# Patient Record
Sex: MALE | Race: WHITE | NOT HISPANIC OR LATINO | URBAN - METROPOLITAN AREA
[De-identification: names, ages, dates, MRNs, and addresses within clinical notes are randomized per-mention and may not be internally consistent; named-entity substitution may affect disease eponyms.]

---

## 2023-07-06 ENCOUNTER — APPOINTMENT (OUTPATIENT)
Dept: LAB | Facility: LAB | Age: 28
End: 2023-07-06

## 2023-11-30 ENCOUNTER — APPOINTMENT (OUTPATIENT)
Dept: GASTROENTEROLOGY | Facility: CLINIC | Age: 28
End: 2023-11-30
Payer: MEDICAID

## 2024-01-05 ENCOUNTER — OFFICE VISIT (OUTPATIENT)
Dept: OPHTHALMOLOGY | Facility: CLINIC | Age: 29
End: 2024-01-05
Payer: COMMERCIAL

## 2024-01-05 DIAGNOSIS — H52.203 MYOPIA OF BOTH EYES WITH ASTIGMATISM: Primary | ICD-10-CM

## 2024-01-05 DIAGNOSIS — H52.13 MYOPIA OF BOTH EYES WITH ASTIGMATISM: Primary | ICD-10-CM

## 2024-01-05 PROCEDURE — 92015 DETERMINE REFRACTIVE STATE: CPT | Performed by: OPTOMETRIST

## 2024-01-05 PROCEDURE — 92004 COMPRE OPH EXAM NEW PT 1/>: CPT | Performed by: OPTOMETRIST

## 2024-01-05 PROCEDURE — 92310 CONTACT LENS FITTING OU: CPT | Performed by: OPTOMETRIST

## 2024-01-05 RX ORDER — QUETIAPINE FUMARATE 200 MG/1
200 TABLET, FILM COATED ORAL NIGHTLY
COMMUNITY

## 2024-01-05 RX ORDER — TOPIRAMATE 50 MG/1
50 TABLET, FILM COATED ORAL 2 TIMES DAILY
COMMUNITY

## 2024-01-05 ASSESSMENT — VISUAL ACUITY
OS_CC+: +1
OS_CC: 20/40
METHOD: SNELLEN - LINEAR
CORRECTION_TYPE: GLASSES
OD_CC+: +2
OD_CC: 20/40

## 2024-01-05 ASSESSMENT — REFRACTION_CURRENTRX
OD_BASECURVE: 8.6
OS_BASECURVE: 8.6
OD_CYLINDER: -0.75
OD_CYLINDER: -1.25
OS_CYLINDER: -0.75
OD_BRAND: TOTAL 30 FOR ASTIGMATISM
OD_DIAMETER: 14.5
OD_AXIS: 160
OD_BRAND: TOTAL 30 FOR ASTIGMATISM
OS_SPHERE: -3.00
OS_CYLINDER: -0.75
OS_BRAND: TOTAL 30 FOR ASTIGMATISM
OD_AXIS: 160
OD_SPHERE: -2.75
OS_BASECURVE: 8.6
OD_DIAMETER: 14.5
OS_DIAMETER: 14.5
OS_BRAND: TOTAL 30 FOR ASTIGMATISM
OS_DIAMETER: 14.5
OS_SPHERE: -3.00
OS_AXIS: 040
OS_AXIS: 010
OD_BASECURVE: 8.6
OD_SPHERE: -2.50

## 2024-01-05 ASSESSMENT — REFRACTION_MANIFEST
OD_AXIS: 155
OS_AXIS: 015
OD_CYLINDER: -1.25
OD_SPHERE: -2.75
OD_AXIS: 150
OS_CYLINDER: -1.00
OS_SPHERE: -2.50
METHOD_AUTOREFRACTION: 1
OS_SPHERE: -3.00
OD_SPHERE: -2.25
OS_AXIS: 015
OD_CYLINDER: -1.25
OS_CYLINDER: -1.00

## 2024-01-05 ASSESSMENT — EXTERNAL EXAM - RIGHT EYE: OD_EXAM: NORMAL

## 2024-01-05 ASSESSMENT — REFRACTION_WEARINGRX
OD_AXIS: 150
OS_CYLINDER: -0.50
OD_SPHERE: -2.00
OS_AXIS: 042
OD_CYLINDER: -1.00
OS_SPHERE: -2.75
SPECS_TYPE: SVL

## 2024-01-05 ASSESSMENT — ENCOUNTER SYMPTOMS
HEMATOLOGIC/LYMPHATIC NEGATIVE: 0
NEUROLOGICAL NEGATIVE: 0
GASTROINTESTINAL NEGATIVE: 0
CONSTITUTIONAL NEGATIVE: 0
PSYCHIATRIC NEGATIVE: 0
EYES NEGATIVE: 0
RESPIRATORY NEGATIVE: 0
ALLERGIC/IMMUNOLOGIC NEGATIVE: 0
MUSCULOSKELETAL NEGATIVE: 0
ENDOCRINE NEGATIVE: 0
CARDIOVASCULAR NEGATIVE: 0

## 2024-01-05 ASSESSMENT — TONOMETRY
IOP_METHOD: GOLDMANN APPLANATION
OS_IOP_MMHG: 17
OD_IOP_MMHG: 16

## 2024-01-05 ASSESSMENT — CONF VISUAL FIELD
OD_SUPERIOR_TEMPORAL_RESTRICTION: 0
OD_INFERIOR_TEMPORAL_RESTRICTION: 0
OS_SUPERIOR_NASAL_RESTRICTION: 0
OD_SUPERIOR_NASAL_RESTRICTION: 0
OS_SUPERIOR_TEMPORAL_RESTRICTION: 0
OS_INFERIOR_TEMPORAL_RESTRICTION: 0
OS_NORMAL: 1
OD_INFERIOR_NASAL_RESTRICTION: 0
OD_NORMAL: 1
OS_INFERIOR_NASAL_RESTRICTION: 0

## 2024-01-05 ASSESSMENT — REFRACTION
OD_AXIS: 155
OS_SPHERE: -2.75
OD_SPHERE: -2.75
OS_CYLINDER: -1.00
OS_AXIS: 015
OD_CYLINDER: -1.25

## 2024-01-05 ASSESSMENT — SLIT LAMP EXAM - LIDS
COMMENTS: NORMAL
COMMENTS: NORMAL

## 2024-01-05 ASSESSMENT — EXTERNAL EXAM - LEFT EYE: OS_EXAM: NORMAL

## 2024-01-05 NOTE — PROGRESS NOTES
[place] [facility] [place] and cylinder was adjusted. Will send trial lenses first. A contact lens prescription was dispensed at the patient's request. Recommend to try wearing reasders over contacts +1 power as well. A spectacle prescription was dispensed to be used as needed. The patient was asked to return to our clinic in one year or sooner if ocular or vision changes occur.

## 2024-01-15 ENCOUNTER — LAB (OUTPATIENT)
Dept: LAB | Facility: LAB | Age: 29
End: 2024-01-15
Payer: MEDICAID

## 2024-01-15 ENCOUNTER — OFFICE VISIT (OUTPATIENT)
Dept: GASTROENTEROLOGY | Facility: CLINIC | Age: 29
End: 2024-01-15
Payer: MEDICAID

## 2024-01-15 VITALS — OXYGEN SATURATION: 98 % | BODY MASS INDEX: 29.68 KG/M2 | WEIGHT: 212 LBS | HEART RATE: 87 BPM | HEIGHT: 71 IN

## 2024-01-15 DIAGNOSIS — K52.9 FREQUENT STOOLS: ICD-10-CM

## 2024-01-15 DIAGNOSIS — K92.1 HEMATOCHEZIA: ICD-10-CM

## 2024-01-15 DIAGNOSIS — K64.8 INTERNAL BLEEDING HEMORRHOIDS: ICD-10-CM

## 2024-01-15 DIAGNOSIS — K92.1 HEMATOCHEZIA: Primary | ICD-10-CM

## 2024-01-15 LAB
ALBUMIN SERPL BCP-MCNC: 4.6 G/DL (ref 3.4–5)
ALP SERPL-CCNC: 118 U/L (ref 33–120)
ALT SERPL W P-5'-P-CCNC: 38 U/L (ref 10–52)
ANION GAP SERPL CALC-SCNC: 13 MMOL/L (ref 10–20)
AST SERPL W P-5'-P-CCNC: 31 U/L (ref 9–39)
BILIRUB SERPL-MCNC: 0.3 MG/DL (ref 0–1.2)
BUN SERPL-MCNC: 15 MG/DL (ref 6–23)
CALCIUM SERPL-MCNC: 9.8 MG/DL (ref 8.6–10.6)
CHLORIDE SERPL-SCNC: 107 MMOL/L (ref 98–107)
CO2 SERPL-SCNC: 27 MMOL/L (ref 21–32)
CREAT SERPL-MCNC: 0.93 MG/DL (ref 0.5–1.3)
CRP SERPL-MCNC: 0.33 MG/DL
EGFRCR SERPLBLD CKD-EPI 2021: >90 ML/MIN/1.73M*2
ERYTHROCYTE [DISTWIDTH] IN BLOOD BY AUTOMATED COUNT: 12.5 % (ref 11.5–14.5)
ERYTHROCYTE [SEDIMENTATION RATE] IN BLOOD BY WESTERGREN METHOD: 2 MM/H (ref 0–15)
GLUCOSE SERPL-MCNC: 84 MG/DL (ref 74–99)
HCT VFR BLD AUTO: 45.4 % (ref 41–52)
HGB BLD-MCNC: 15.6 G/DL (ref 13.5–17.5)
MCH RBC QN AUTO: 30.5 PG (ref 26–34)
MCHC RBC AUTO-ENTMCNC: 34.4 G/DL (ref 32–36)
MCV RBC AUTO: 89 FL (ref 80–100)
NRBC BLD-RTO: 0 /100 WBCS (ref 0–0)
PLATELET # BLD AUTO: 221 X10*3/UL (ref 150–450)
POTASSIUM SERPL-SCNC: 4.5 MMOL/L (ref 3.5–5.3)
PROT SERPL-MCNC: 6.8 G/DL (ref 6.4–8.2)
RBC # BLD AUTO: 5.11 X10*6/UL (ref 4.5–5.9)
SODIUM SERPL-SCNC: 142 MMOL/L (ref 136–145)
WBC # BLD AUTO: 7.7 X10*3/UL (ref 4.4–11.3)

## 2024-01-15 PROCEDURE — 99204 OFFICE O/P NEW MOD 45 MIN: CPT

## 2024-01-15 PROCEDURE — 85652 RBC SED RATE AUTOMATED: CPT

## 2024-01-15 PROCEDURE — 36415 COLL VENOUS BLD VENIPUNCTURE: CPT

## 2024-01-15 PROCEDURE — 80053 COMPREHEN METABOLIC PANEL: CPT

## 2024-01-15 PROCEDURE — 85027 COMPLETE CBC AUTOMATED: CPT

## 2024-01-15 PROCEDURE — 86140 C-REACTIVE PROTEIN: CPT

## 2024-01-15 RX ORDER — SODIUM PICOSULFATE, MAGNESIUM OXIDE, AND ANHYDROUS CITRIC ACID 12; 3.5; 1 G/175ML; G/175ML; MG/175ML
2 LIQUID ORAL ONCE
Qty: 175 ML | Refills: 0 | Status: SHIPPED | OUTPATIENT
Start: 2024-01-15 | End: 2024-01-15

## 2024-01-15 RX ORDER — HYDROCORTISONE ACETATE 25 MG/1
25 SUPPOSITORY RECTAL DAILY
Qty: 14 SUPPOSITORY | Refills: 3 | Status: SHIPPED | OUTPATIENT
Start: 2024-01-15 | End: 2024-06-07 | Stop reason: WASHOUT

## 2024-01-15 RX ORDER — GABAPENTIN 100 MG/1
CAPSULE ORAL
COMMUNITY
Start: 2020-11-12

## 2024-01-15 ASSESSMENT — ENCOUNTER SYMPTOMS
CHILLS: 0
ANAL BLEEDING: 1
ABDOMINAL PAIN: 0
VOMITING: 0
BLOOD IN STOOL: 1
COUGH: 0
APPETITE CHANGE: 0
CONSTIPATION: 0
FATIGUE: 0
FEVER: 0
NAUSEA: 0
SHORTNESS OF BREATH: 0
RECTAL PAIN: 0
TROUBLE SWALLOWING: 0
ABDOMINAL DISTENTION: 0
DIARRHEA: 0

## 2024-01-15 NOTE — PATIENT INSTRUCTIONS
Please take 1-2 spoonfuls daily of fiber.  Dissolve in 8 oz liquid.  Please get labs and I will notify you of results  Use rectal suppositories daily  Please schedule your colonoscopy. You will need a ride since this involves sedation.  I will send a bowel prep to your pharmacy.  Clear liquid diet the day before the procedure. Start the 1st part of the prep at 6 pm the night prior and the other half 5 hrs before the procedure.  Please follow up 3 weeks post procedure

## 2024-01-15 NOTE — PROGRESS NOTES
Subjective     History of Present Illness:   Cipriano Hernandez is a 29 y.o. male with PMHx of bipolar, GI bleed, [place], former EtOH, cannabis, cocaine use who presents to GI clinic for further evaluation hemorrhoids    Today, patient having a lot of bright red blood leaking out during bowel movements over the past year, separate from stool.  Not painful but bleeding often.  Was recommended to have a band ligation by his brother because he had same symptoms.  Has frequent bowel movements (7/day) for the past few years.  Stool is formed and soft, denies straining, eats high fiber diet.  Has taken supplemental fiber in the past but is not currently taking.  Lifts heavy objects often.  Denies any rectal pain or burning.  Denies abdominal pain.  Denies constipation, diarrhea, dyspepsia, melena, dysphagia, unintentional weight loss    Denies ETOH, smoking, marijuana  Denies fxh GI cancer: paternal grandpa colon cancer.  Denies history of IBD  Abdominal Surgeries: denies    Last colonoscopy 5/2018 Dr. Carson for hematochezia: Internal hemorrhoids.  Biopsies showed positive focal active colitis of ascending colon.  Repeat age 45  Denies history of EGD     Past Medical History  As per HPI.     Social History  he       Family History  his family history is not on file.     Review of Systems  Review of Systems   Constitutional:  Negative for appetite change, chills, fatigue and fever.   HENT:  Negative for trouble swallowing.    Respiratory:  Negative for cough and shortness of breath.    Gastrointestinal:  Positive for anal bleeding and blood in stool. Negative for abdominal distention, abdominal pain, constipation, diarrhea, nausea, rectal pain and vomiting.       Allergies  No Known Allergies    Medications  Current Outpatient Medications   Medication Instructions    Clenpiq 10 mg-3.5 gram- 12 gram/175 mL solution 2 bottles, oral, Once, Drink one bottle starting at 6:00 pm the night before the procedure and the second bottle 5  hours prior to the procedure    gabapentin (Neurontin) 100 mg capsule oral    hydrocortisone (ANUSOL-HC) 25 mg, rectal, Daily, Take prior to procedure as directed    QUEtiapine (SEROQUEL) 200 mg, oral, Nightly    topiramate (TOPAMAX) 50 mg, oral, 2 times daily        Objective   Visit Vitals  Pulse 87      Physical Exam  Constitutional:       Appearance: Normal appearance. He is normal weight.   HENT:      Mouth/Throat:      Mouth: Mucous membranes are dry.      Pharynx: Oropharynx is clear.   Cardiovascular:      Rate and Rhythm: Normal rate and regular rhythm.   Pulmonary:      Effort: Pulmonary effort is normal.      Breath sounds: Normal breath sounds. No wheezing or rhonchi.   Abdominal:      General: Abdomen is flat. Bowel sounds are normal. There is no distension.      Palpations: Abdomen is soft. There is no hepatomegaly.      Tenderness: There is no abdominal tenderness. There is no guarding or rebound. Negative signs include Gonzalez's sign.      Hernia: No hernia is present.   Musculoskeletal:         General: Normal range of motion.   Skin:     General: Skin is warm and dry.   Neurological:      General: No focal deficit present.      Mental Status: He is alert and oriented to person, place, and time.   Psychiatric:         Mood and Affect: Mood normal.         Behavior: Behavior normal.           Lab Results   Component Value Date    WBC 6.7 11/11/2020    WBC 7.3 08/14/2020    HGB 16.0 11/11/2020    HGB 16.3 08/14/2020    HCT 46.6 11/11/2020    HCT 47.5 08/14/2020     11/11/2020     08/14/2020     Lab Results   Component Value Date     11/11/2020     08/14/2020    K 4.3 11/11/2020    K 4.4 08/14/2020     (H) 11/11/2020     08/14/2020    CO2 24 11/11/2020    CO2 28 08/14/2020    BUN 12 11/11/2020    BUN 15 08/14/2020    CREATININE 0.94 11/11/2020    CREATININE 0.88 08/14/2020    CALCIUM 10.1 11/11/2020    CALCIUM 9.5 08/14/2020    PROT 7.1 12/30/2020    PROT 7.4  11/11/2020    PROT 7.1 08/14/2020    BILITOT 0.3 12/30/2020    BILITOT 0.5 11/11/2020    BILITOT 0.4 08/14/2020    ALKPHOS 85 12/30/2020    ALKPHOS 70 11/11/2020    ALKPHOS 86 08/14/2020    ALT 25 12/30/2020    ALT 61 (H) 11/11/2020    ALT 17 08/14/2020    AST 17 12/30/2020    AST 34 11/11/2020    AST 16 08/14/2020    GLUCOSE 82 11/11/2020    GLUCOSE 86 08/14/2020           Cipriano Hernandez is a 29 y.o. male who presents to GI clinic for rectal bleeding.    Gastrointestinal and Abdominal  Symptoms suggestive of IBS with hemorrhoidal bleeding, possible IBD    -CBC, CMP, CRP, ESR ordered  -Colonoscopy ordered  -Anusol suppositories  -Fiber supplement recommended    Follow-up 3 weeks postprocedure         Sita Nguyen, APRN-CNP

## 2024-01-15 NOTE — ASSESSMENT & PLAN NOTE
Symptoms suggestive of IBS with hemorrhoidal bleeding, possible IBD    -CBC, CMP, CRP, ESR ordered  -Colonoscopy ordered  -Anusol suppositories  -Fiber supplement recommended    Follow-up 3 weeks postprocedure

## 2024-01-22 ENCOUNTER — OFFICE VISIT (OUTPATIENT)
Dept: GASTROENTEROLOGY | Facility: EXTERNAL LOCATION | Age: 29
End: 2024-01-22
Payer: MEDICAID

## 2024-01-22 DIAGNOSIS — K52.9 FREQUENT STOOLS: ICD-10-CM

## 2024-01-22 DIAGNOSIS — K92.1 BLOOD IN STOOL: ICD-10-CM

## 2024-01-22 DIAGNOSIS — K64.8 INTERNAL HEMORRHOIDS: ICD-10-CM

## 2024-01-22 DIAGNOSIS — D12.2 BENIGN NEOPLASM OF ASCENDING COLON: ICD-10-CM

## 2024-01-22 DIAGNOSIS — K52.9 CHRONIC DIARRHEA OF UNKNOWN ORIGIN: Primary | ICD-10-CM

## 2024-01-22 PROCEDURE — 88305 TISSUE EXAM BY PATHOLOGIST: CPT

## 2024-01-22 PROCEDURE — 45380 COLONOSCOPY AND BIOPSY: CPT | Performed by: INTERNAL MEDICINE

## 2024-01-22 PROCEDURE — 88305 TISSUE EXAM BY PATHOLOGIST: CPT | Performed by: PATHOLOGY

## 2024-01-23 ENCOUNTER — LAB REQUISITION (OUTPATIENT)
Dept: LAB | Facility: HOSPITAL | Age: 29
End: 2024-01-23
Payer: MEDICAID

## 2024-01-24 DIAGNOSIS — K64.8 INTERNAL HEMORRHOIDS: Primary | ICD-10-CM

## 2024-01-25 LAB
LABORATORY COMMENT REPORT: NORMAL
PATH REPORT.FINAL DX SPEC: NORMAL
PATH REPORT.GROSS SPEC: NORMAL
PATH REPORT.RELEVANT HX SPEC: NORMAL
PATH REPORT.TOTAL CANCER: NORMAL

## 2024-01-26 NOTE — RESULT ENCOUNTER NOTE
The biopsies from your small intestine and colon are normal with no evidence of inflammation or infection.    The polyp removed during your colonoscopy is an adenoma which is benign, but has the potential to turn into cancer if not removed.  Your polyp was completely removed.  Since adenomas have a tendency to recur, I recommend a repeat colonoscopy in 7 years.  We will contact you at that time to schedule an appointment. Please contact the office with any questions or concerns.      Lucy Wiggins

## 2024-04-14 DIAGNOSIS — F31.81 BIPOLAR II DISORDER (MULTI): ICD-10-CM

## 2024-04-15 RX ORDER — QUETIAPINE FUMARATE 200 MG/1
200 TABLET, FILM COATED ORAL NIGHTLY
Qty: 90 TABLET | OUTPATIENT
Start: 2024-04-15

## 2024-05-20 NOTE — PROGRESS NOTES
Chief complaint:  Hemorrhoids    History Of Present Illness  Subjective   Cipriano Hernandez was self-referred for evaluation of Hemorrhoid    Has had for a while. Has flares on and off for several months. Very problematic at work.     Pain: Yes - Occasional pain after BM for about 5 minutes.  Protruding Tissue: No  Bleeding: Yes - BRBPR in the toilet bowel and on the  toilet tissue.     Bowel habits:  Moves bowels  2-10  Stool is  loose  He  does not strain and spends 10 minutes on the toilet to have a BM    Dietary history:   Eats a high fiber diet yes  Drinks approximately 2 liters water daily  Exercise: yes  Fiber supplement: no    Colonoscopy: 1/22/24 colonoscopy to cecum  Ascending colon polyp  Internal hemorrhoids  FINAL DIAGNOSIS   A.  TERMINAL ILEUM, BIOPSY:  - SMALL INTESTINAL MUCOSA, NO SIGNIFICANT HISTOPATHOLOGICAL ABNORMALITIES.     B.  ASCENDING COLON, POLYPECTOMY:  - TUBULAR ADENOMA.     C.  RIGHT COLON, BIOPSIES:  - COLONIC MUCOSA, NO SIGNIFICANT HISTOPATHOLOGICAL ABNORMALITIES.     D.  LEFT COLON, BIOPSIES:  - COLONIC MUCOSA, NO SIGNIFICANT HISTOPATHOLOGICAL ABNORMALITIES.     PMH: none  PSH: none  Family history: Paternal grandfather with colon  Tobacco use: Denies  Alcohol use: denies  Recreation drugs: denies    Review of Systems      Constitutional: Negative for fever, chills, anorexia, weight loss, malaise   ENMT: Negative for nasal discharge, congestion, ear pain, mouth pain, throat pain  Respiratory: Negative for cough, hemoptysis, wheezing, shortness of breath  Cardiac: Negative for chest pain, dyspnea on exertion, orthopnea, palpitations, syncope  Gastrointestinal: Negative for nausea, vomiting, diarrhea, constipation, abdominal pain  Genitourinary: Negative for discharge, dysuria, flank pain, frequency, hematuria  Musculoskeletal: Negative for decreased ROM, pain, swelling, weakness   Neurological: Negative for dizziness, confusion, headache, seizures, syncope   Psychiatric: Negative for mood  changes, anxiety, hallucinations, sleep changes, suicidal ideas  Skin: Negative for mass, pain, itching, rash, ulcer   Endocrine: Negative for heat intolerance, cold intolerance, excessive sweating, polyuria, excess thirst   Hematologic/Lymph: Negative for anemia, bruising, easy bleeding, night sweats, petechiae, history of DVT/PE or cancer   Allergic/Immunologic: Negative for anaphylaxis, itchy/ teary eyes, itching, sneezing, swelling     Physical Exam  Constitutional: Well developed, awake/alert/oriented x3, no distress, alert and cooperative   Eyes: Sclera anicteric, no conjunctival inflammation, conjugate gaze   ENMT: mucous membranes moist, no apparent injury,   Head/Neck: Neck supple, no apparent injury, trachea midline, no bruits   Respiratory/Thorax: Patent airways, CTAB, normal breath sounds with good chest expansion  Cardiovascular: Regular, rate and rhythm, no murmurs, normal S1 and S2   Gastrointestinal: Nondistended, soft, non-tender, no rebound tenderness or guarding, no masses palpable, no organomegaly, +BS, no bruits   Extremities: normal extremities, no edema  Neurological: alert and oriented x3, normal strength, Normal gait   Lymphatic: No palpable inguinal lymphadenopathy   Psychological: Appropriate mood and behavior   Skin: Warm and dry, no lesions, no rashes   Anorectal: Normal tone, no external hemorrhoids or lesions, smooth mucosa    Patient ID: Cipriano Hernandez is a 29 y.o. male.    Anoscopy    Date/Time: 5/21/2024 8:46 AM    Performed by: BATOOL Burns  Authorized by: BATOOL Burns    Consent:     Consent obtained:  Verbal    Consent given by:  Patient    Risks, benefits, and alternatives were discussed: yes      Risks discussed:  Bleeding and pain    Alternatives discussed:  No treatment, alternative treatment and observation  Universal protocol:     Procedure explained and questions answered to patient or proxy's satisfaction: yes      Relevant documents present and  verified: yes      Test results available: no      Imaging studies available: no      Required blood products, implants, devices, and special equipment available: no      Site/side marked: no      Immediately prior to procedure, a time out was called: no      Patient identity confirmed:  Verbally with patient  Indications:     Indications: rectal bleeding    Procedure details:     Internal hemorrhoids: yes      Inflammation: yes      Anal fissures: no      Anal fistulae: no      Anal stricture: no      Abscess: no      Tearing: no      Blood in rectal vault: no    Post-procedure details:     Procedure completion:  Tolerated well, no immediate complications  Comments:      Large internal hemorrhoids with scant amount of blood noted.    A chaperone was present during the exam, ANNA Patterson.     Assessment:  Large internal hemorrhois    Plan:  -Increase fluid intake: aim for 8-10, 8 oz glasses of water daily. Other beverages, such as juice, coffee, or tea may count toward this goal.  -Increase high fiber foods: fruits , vegetables, whole grains, and beans. Aim for 25-35 grams per day.  -Add a powder fiber supplement daily. Like Metamucil, Citrucel, Konsyl or Benefiber daily-follow instructions on the bottle.  -Avoiding sitting on the toilet for prolonged periods of time  -Avoid straining  -Follow-up for rubber band when you are ready    Emperatriz Spencer, VIOLETTA-CNP

## 2024-05-21 ENCOUNTER — OFFICE VISIT (OUTPATIENT)
Dept: SURGERY | Facility: CLINIC | Age: 29
End: 2024-05-21
Payer: MEDICAID

## 2024-05-21 VITALS
BODY MASS INDEX: 28.7 KG/M2 | DIASTOLIC BLOOD PRESSURE: 80 MMHG | OXYGEN SATURATION: 97 % | TEMPERATURE: 97.5 F | SYSTOLIC BLOOD PRESSURE: 127 MMHG | HEART RATE: 81 BPM | WEIGHT: 205 LBS | HEIGHT: 71 IN

## 2024-05-21 DIAGNOSIS — K64.8 INTERNAL HEMORRHOIDS: ICD-10-CM

## 2024-05-21 PROCEDURE — 1036F TOBACCO NON-USER: CPT | Performed by: NURSE PRACTITIONER

## 2024-05-21 PROCEDURE — 46600 DIAGNOSTIC ANOSCOPY SPX: CPT | Performed by: NURSE PRACTITIONER

## 2024-05-21 PROCEDURE — 99203 OFFICE O/P NEW LOW 30 MIN: CPT | Performed by: NURSE PRACTITIONER

## 2024-05-21 ASSESSMENT — PAIN SCALES - GENERAL: PAINLEVEL: 0-NO PAIN

## 2024-05-21 ASSESSMENT — ENCOUNTER SYMPTOMS: RECTAL BLEEDING: 1

## 2024-05-21 NOTE — PATIENT INSTRUCTIONS
-Increase fluid intake: aim for 8-10, 8 oz glasses of water daily. Other beverages, such as juice, coffee, or tea may count toward this goal.  -Increase high fiber foods: fruits , vegetables, whole grains, and beans. Aim for 25-35 grams per day.  -Add a powder fiber supplement daily. Like Metamucil, Citrucel, Konsyl or Benefiber daily-follow instructions on the bottle.  -Avoiding sitting on the toilet for prolonged periods of time  -Avoid straining  -Follow-up for rubber band when you are ready    It was a pleasure to see you in the office today.  If you have any questions or concerns, please let me know.    Sincerely,  Emperatriz Spencer  Certified Nurse Practitioner-Colorectal surgery    [email or web address]  [phone] (Confidential voice mail)

## 2024-05-30 NOTE — PROGRESS NOTES
Chief complaint:  FUV Hemorrhoids    History Of Present Illness  Subjective   Cipriano Hernandez was self-referred for evaluation of Hemorrhoid    Has had for a while. Has flares on and off for several months. Very problematic at work.     Pain: Yes - Occasional pain after BM for about 5 minutes.  Protruding Tissue: No  Bleeding: Yes - BRBPR in the toilet bowel and on the  toilet tissue.     Bowel habits:  Moves bowels  2-10  Stool is  loose  He  does not strain and spends 10 minutes on the toilet to have a BM    Dietary history:   Eats a high fiber diet yes  Drinks approximately 2 liters water daily  Exercise: yes  Fiber supplement: no    Colonoscopy: 1/22/24 colonoscopy to cecum  Ascending colon polyp  Internal hemorrhoids  FINAL DIAGNOSIS   A.  TERMINAL ILEUM, BIOPSY:  - SMALL INTESTINAL MUCOSA, NO SIGNIFICANT HISTOPATHOLOGICAL ABNORMALITIES.     B.  ASCENDING COLON, POLYPECTOMY:  - TUBULAR ADENOMA.     C.  RIGHT COLON, BIOPSIES:  - COLONIC MUCOSA, NO SIGNIFICANT HISTOPATHOLOGICAL ABNORMALITIES.     D.  LEFT COLON, BIOPSIES:  - COLONIC MUCOSA, NO SIGNIFICANT HISTOPATHOLOGICAL ABNORMALITIES.     Interval history:  He was last seen 5/21/24. He returns today for ***    Review of Systems    Constitutional: Negative for fever, chills, anorexia, weight loss, malaise   ENMT: Negative for nasal discharge, congestion, ear pain, mouth pain, throat pain  Respiratory: Negative for cough, hemoptysis, wheezing, shortness of breath  Cardiac: Negative for chest pain, dyspnea on exertion, orthopnea, palpitations, syncope  Gastrointestinal: Negative for nausea, vomiting, diarrhea, constipation, abdominal pain  Genitourinary: Negative for discharge, dysuria, flank pain, frequency, hematuria  Musculoskeletal: Negative for decreased ROM, pain, swelling, weakness   Neurological: Negative for dizziness, confusion, headache, seizures, syncope   Psychiatric: Negative for mood changes, anxiety, hallucinations, sleep changes, suicidal  ideas  Skin: Negative for mass, pain, itching, rash, ulcer   Endocrine: Negative for heat intolerance, cold intolerance, excessive sweating, polyuria, excess thirst   Hematologic/Lymph: Negative for anemia, bruising, easy bleeding, night sweats, petechiae, history of DVT/PE or cancer   Allergic/Immunologic: Negative for anaphylaxis, itchy/ teary eyes, itching, sneezing, swelling     Physical Exam  Constitutional: Well developed, awake/alert/oriented x3, no distress, alert and cooperative   Eyes: Sclera anicteric, no conjunctival inflammation, conjugate gaze   ENMT: mucous membranes moist, no apparent injury,   Head/Neck: Neck supple, no apparent injury, trachea midline, no bruits   Respiratory/Thorax: Patent airways, CTAB, normal breath sounds with good chest expansion  Cardiovascular: Regular, rate and rhythm, no murmurs, normal S1 and S2   Gastrointestinal: Nondistended, soft, non-tender, no rebound tenderness or guarding, no masses palpable, no organomegaly, +BS, no bruits   Extremities: normal extremities, no edema  Neurological: alert and oriented x3, normal strength, Normal gait   Lymphatic: No palpable inguinal lymphadenopathy   Psychological: Appropriate mood and behavior   Skin: Warm and dry, no lesions, no rashes   Anorectal: Normal tone, no external hemorrhoids or lesions, smooth mucosa    Patient ID: Cipriano Hernandez is a 29 y.o. male.    Procedures  A chaperone was present during the exam, *** MA.     Assessment:  Large internal hemorrhois    Plan:  ***    Emperatriz Spencer, APRN-CNP

## 2024-05-31 ENCOUNTER — APPOINTMENT (OUTPATIENT)
Dept: SURGERY | Facility: CLINIC | Age: 29
End: 2024-05-31
Payer: MEDICAID

## 2024-06-07 ENCOUNTER — OFFICE VISIT (OUTPATIENT)
Dept: SURGERY | Facility: CLINIC | Age: 29
End: 2024-06-07
Payer: MEDICAID

## 2024-06-07 ENCOUNTER — TELEPHONE (OUTPATIENT)
Dept: OPHTHALMOLOGY | Facility: CLINIC | Age: 29
End: 2024-06-07

## 2024-06-07 VITALS
HEART RATE: 76 BPM | BODY MASS INDEX: 29.12 KG/M2 | HEIGHT: 71 IN | TEMPERATURE: 98.1 F | DIASTOLIC BLOOD PRESSURE: 79 MMHG | SYSTOLIC BLOOD PRESSURE: 120 MMHG | WEIGHT: 208 LBS

## 2024-06-07 DIAGNOSIS — K64.8 INTERNAL BLEEDING HEMORRHOIDS: Primary | ICD-10-CM

## 2024-06-07 PROCEDURE — 46221 LIGATION OF HEMORRHOID(S): CPT | Performed by: NURSE PRACTITIONER

## 2024-06-07 PROCEDURE — 99213 OFFICE O/P EST LOW 20 MIN: CPT | Performed by: NURSE PRACTITIONER

## 2024-06-07 PROCEDURE — 1036F TOBACCO NON-USER: CPT | Performed by: NURSE PRACTITIONER

## 2024-06-07 NOTE — PATIENT INSTRUCTIONS
Rubber Band Ligation  Patients with internal hemorrhoids and symptoms of rectal bleeding may be treated by rubber band ligation. The concept of ligation is to place an elastic band around the hemorrhoid, thus constricting blood flow.   What to expect after rubber band ligation:  You may feel mild pain and/or pressure, or you may feel as if you need to have a bowel movement. This usually goes away within a few days after the procedure. Do not sit on the toilet and strain.  You may have a small amount of bleeding from the anus between 3 to 10 days after the procedure, when the hemorrhoid falls off. This is normal.  Activity:  Rest, as needed.   Try to walk as much as tolerated.  Avoid strenuous activities, bicycle riding, jogging, weight lifting, or other vigorous types of activity for 3-4 days following the procedure.  You may shower and bath as usual.  Diet  You can eat your normal diet.   Drink plenty of fluids, at least 8-8 oz glasses of water daily.  Eat a high fiber diet; include fruits, vegetables, and whole grains.  Other Instructions  Soak in a warm tub of water or do sitz baths for 15-20 minutes 3 times daily and after a bowel movement, as needed for discomfort.  Add a fiber supplement, like Metamucil, Citrucel, or benefiber daily. Start with the smallest amount on the container and increase slowly.   Avoid constipation. You may take a stool softener if needed.  When to call your provider:  Excessive bleeding.  Pain which persists or becomes worse after the first 72 hours.  Any signs of infection, such as increased pain, swelling, warmth, or redness, purulent drain, and/or fever greater than 100.5 F.  If you have any difficulty passing urine

## 2024-06-07 NOTE — PROGRESS NOTES
Chief complaint:  FUV Hemorrhoids    History Of Present Illness  Subjective   Cipriano Hernandez was self-referred for evaluation of Hemorrhoid    Has had for a while. Has flares on and off for several months. Very problematic at work.     Pain: Yes - Occasional pain after BM for about 5 minutes.  Protruding Tissue: No  Bleeding: Yes - BRBPR in the toilet bowel and on the  toilet tissue.     Bowel habits:  Moves bowels  2-10  Stool is  loose  He  does not strain and spends 10 minutes on the toilet to have a BM    Dietary history:   Eats a high fiber diet yes  Drinks approximately 2 liters water daily  Exercise: yes  Fiber supplement: no    Colonoscopy: 1/22/24 colonoscopy to cecum  Ascending colon polyp  Internal hemorrhoids  FINAL DIAGNOSIS   A.  TERMINAL ILEUM, BIOPSY:  - SMALL INTESTINAL MUCOSA, NO SIGNIFICANT HISTOPATHOLOGICAL ABNORMALITIES.     B.  ASCENDING COLON, POLYPECTOMY:  - TUBULAR ADENOMA.     C.  RIGHT COLON, BIOPSIES:  - COLONIC MUCOSA, NO SIGNIFICANT HISTOPATHOLOGICAL ABNORMALITIES.     D.  LEFT COLON, BIOPSIES:  - COLONIC MUCOSA, NO SIGNIFICANT HISTOPATHOLOGICAL ABNORMALITIES.     Interval history:  He was last seen 5/21/24. He returns today for rubber band ligation.  Started taking metamucil capsules. HE thinks this helps pass the stool easier.   Denies any change in his medical history.  Denies taking any NSAIDS    Review of Systems    Constitutional: Negative for fever, chills, anorexia, weight loss, malaise   ENMT: Negative for nasal discharge, congestion, ear pain, mouth pain, throat pain  Respiratory: Negative for cough, hemoptysis, wheezing, shortness of breath  Cardiac: Negative for chest pain, dyspnea on exertion, orthopnea, palpitations, syncope  Gastrointestinal: Negative for nausea, vomiting, diarrhea, constipation, abdominal pain  Genitourinary: Negative for discharge, dysuria, flank pain, frequency, hematuria  Musculoskeletal: Negative for decreased ROM, pain, swelling,  weakness   Neurological: Negative for dizziness, confusion, headache, seizures, syncope   Psychiatric: Negative for mood changes, anxiety, hallucinations, sleep changes, suicidal ideas  Skin: Negative for mass, pain, itching, rash, ulcer   Endocrine: Negative for heat intolerance, cold intolerance, excessive sweating, polyuria, excess thirst   Hematologic/Lymph: Negative for anemia, bruising, easy bleeding, night sweats, petechiae, history of DVT/PE or cancer   Allergic/Immunologic: Negative for anaphylaxis, itchy/ teary eyes, itching, sneezing, swelling     Physical Exam  Constitutional: Well developed, awake/alert/oriented x3, no distress, alert and cooperative   Eyes: Sclera anicteric, no conjunctival inflammation, conjugate gaze   ENMT: mucous membranes moist, no apparent injury,   Head/Neck: Neck supple, no apparent injury, trachea midline, no bruits   Respiratory/Thorax: Patent airways, CTAB, normal breath sounds with good chest expansion  Cardiovascular: Regular, rate and rhythm, no murmurs, normal S1 and S2   Gastrointestinal: Nondistended, soft, non-tender, no rebound tenderness or guarding, no masses palpable, no organomegaly, +BS, no bruits   Extremities: normal extremities, no edema  Neurological: alert and oriented x3, normal strength, Normal gait   Lymphatic: No palpable inguinal lymphadenopathy   Psychological: Appropriate mood and behavior   Skin: Warm and dry, no lesions, no rashes   Anorectal: Normal tone, no external hemorrhoids or lesions, smooth mucosa    Patient ID: Cipriano Hernandez is a 29 y.o. male.    Anoscopy    Date/Time: 6/7/2024 1:38 PM    Performed by: BATOOL Burns  Authorized by: BATOOL Burns    Consent:     Consent obtained:  Verbal and written    Consent given by:  Patient    Risks, benefits, and alternatives were discussed: yes      Risks discussed:  Bleeding, infection and pain    Alternatives discussed:  No treatment  Universal protocol:     Procedure  explained and questions answered to patient or proxy's satisfaction: yes      Patient identity confirmed:  Verbally with patient  Indications:     Indications:  Large internal hemorrhoids  Sedation:     Sedation type:  None  Anesthesia:     Anesthesia method:  None  Procedure specific details:      Rubber band ligation of right posterior lateral hemorrhoid  Post-procedure details:     Procedure completion:  Tolerated well, no immediate complications      A chaperone was present during the exam, ANNA Hazel.     Assessment:  Large internal hemorrhoid  RBL of right posterior lateral internal hemorrhoid    Plan:  -Reviewed worrisome signs: extreme pain, fever greater than 101, heavy bleeding, and or inability to urinate.  If this happens during business hours, please, call the office at [phone].  After hours please report to the emergency room.  -The band will follow-up in approximately 3 to 10 days.  You may experience a small amount of bleeding at that time which is normal.  If you have any heavy bleeding, please, call the office or report to the emergency room.  -You may experience the sensation to have to move your bowels.  Please avoid sitting on the toilet and straining as you may have the sensation to have a bowel movement due to the band being there.  -Continue daily fiber supplement, like Metamucil, Citrucel, or Benefiber. (Generic is fine to use)  -Sits baths or warm soaks, as needed  -Tylenol, as needed, for pain  -Follow-up, as needed      VIOLETTA Burns-CNP

## 2024-10-30 ENCOUNTER — APPOINTMENT (OUTPATIENT)
Dept: OPHTHALMOLOGY | Facility: CLINIC | Age: 29
End: 2024-10-30
Payer: MEDICAID

## 2024-10-31 ENCOUNTER — OFFICE VISIT (OUTPATIENT)
Dept: OPHTHALMOLOGY | Facility: CLINIC | Age: 29
End: 2024-10-31
Payer: MEDICAID

## 2024-10-31 DIAGNOSIS — H52.13 MYOPIA OF BOTH EYES WITH ASTIGMATISM: ICD-10-CM

## 2024-10-31 DIAGNOSIS — H52.203 MYOPIA OF BOTH EYES WITH ASTIGMATISM: ICD-10-CM

## 2024-10-31 DIAGNOSIS — H04.123 DRY EYE SYNDROME OF BOTH EYES: Primary | ICD-10-CM

## 2024-10-31 PROCEDURE — 99213 OFFICE O/P EST LOW 20 MIN: CPT | Performed by: OPTOMETRIST

## 2024-10-31 RX ORDER — ERGOCALCIFEROL 1.25 MG/1
1 CAPSULE ORAL
COMMUNITY
Start: 2024-02-05

## 2024-10-31 RX ORDER — SODIUM PICOSULFATE, MAGNESIUM OXIDE, AND ANHYDROUS CITRIC ACID 12; 3.5; 1 G/175ML; G/175ML; MG/175ML
LIQUID ORAL
COMMUNITY
Start: 2024-01-15

## 2024-10-31 ASSESSMENT — REFRACTION_CURRENTRX
OD_BASECURVE: 8.6
OD_AXIS: 160
OD_SPHERE: -2.50
OS_BASECURVE: 8.6
OD_CYLINDER: -1.25
OS_SPHERE: -3.00
OS_BRAND: TOTAL 30 FOR ASTIGMATISM
OS_CYLINDER: -0.75
OS_AXIS: 010
OD_BRAND: TOTAL 30 FOR ASTIGMATISM
OS_DIAMETER: 14.5
OS_CYLINDER: -0.75
OD_SPHERE: -2.75
OD_AXIS: 160
OS_BASECURVE: 8.6
OS_SPHERE: -3.00
OD_DIAMETER: 14.5
OS_AXIS: 010
OD_CYLINDER: -1.25
OD_DIAMETER: 14.5
OD_BASECURVE: 8.6
OS_DIAMETER: 14.5

## 2024-10-31 ASSESSMENT — VISUAL ACUITY
CORRECTION_TYPE: CONTACTS
VA_OR_OD_CURRENT_RX: 20/20
METHOD: SNELLEN - LINEAR
OS_CC: 20/20
VA_OR_OS_CURRENT_RX: 20/20
OD_CC: 20/20
OD_CC+: -1

## 2024-10-31 ASSESSMENT — ENCOUNTER SYMPTOMS
PSYCHIATRIC NEGATIVE: 0
RESPIRATORY NEGATIVE: 0
ENDOCRINE NEGATIVE: 0
EYES NEGATIVE: 1
CARDIOVASCULAR NEGATIVE: 0
CONSTITUTIONAL NEGATIVE: 0
HEMATOLOGIC/LYMPHATIC NEGATIVE: 0
MUSCULOSKELETAL NEGATIVE: 0
GASTROINTESTINAL NEGATIVE: 0
ALLERGIC/IMMUNOLOGIC NEGATIVE: 0
NEUROLOGICAL NEGATIVE: 0

## 2024-10-31 ASSESSMENT — CONF VISUAL FIELD
OD_SUPERIOR_NASAL_RESTRICTION: 0
OS_SUPERIOR_NASAL_RESTRICTION: 0
OS_SUPERIOR_TEMPORAL_RESTRICTION: 0
OD_INFERIOR_NASAL_RESTRICTION: 0
OS_NORMAL: 1
OS_INFERIOR_NASAL_RESTRICTION: 0
OD_INFERIOR_TEMPORAL_RESTRICTION: 0
OS_INFERIOR_TEMPORAL_RESTRICTION: 0
OD_NORMAL: 1
OD_SUPERIOR_TEMPORAL_RESTRICTION: 0

## 2024-10-31 ASSESSMENT — REFRACTION_MANIFEST
OS_SPHERE: PLANO
OD_SPHERE: PLANO

## 2024-10-31 ASSESSMENT — EXTERNAL EXAM - RIGHT EYE: OD_EXAM: NORMAL

## 2024-10-31 ASSESSMENT — SLIT LAMP EXAM - LIDS
COMMENTS: NORMAL
COMMENTS: NORMAL

## 2024-10-31 ASSESSMENT — EXTERNAL EXAM - LEFT EYE: OS_EXAM: NORMAL

## 2024-11-01 ENCOUNTER — TELEPHONE (OUTPATIENT)
Dept: OPHTHALMOLOGY | Age: 29
End: 2024-11-01
Payer: MEDICAID

## 2025-01-08 ENCOUNTER — APPOINTMENT (OUTPATIENT)
Dept: OPHTHALMOLOGY | Facility: CLINIC | Age: 30
End: 2025-01-08
Payer: COMMERCIAL